# Patient Record
Sex: MALE | Race: ASIAN
[De-identification: names, ages, dates, MRNs, and addresses within clinical notes are randomized per-mention and may not be internally consistent; named-entity substitution may affect disease eponyms.]

---

## 2019-09-23 NOTE — OP
DATE OF PROCEDURE:  09/23/2019



PREOPERATIVE DIAGNOSES:  Gross hematuria and bladder lesion.



POSTOPERATIVE DIAGNOSES:  Gross hematuria and bladder lesion.



PROCEDURES PERFORMED:  Cysto, bladder biopsy, and fulguration with destruction of a

1 x 1.5 cm area of flat abnormal mucosa on the left floor/posterior wall region. 



SPECIMEN REMOVED:  Bladder biopsies x3.



ESTIMATED BLOOD LOSS:  Less than 25 mL.



DRAINS:  None.



FINDINGS:  There is no stricture disease.  There was mild-to-moderate BPH.  There

was a lesion in the bladder, had no blood on it.  At this time, it was not as easy

to see and identify as it had been in the office.  It was biopsied 3 times and then

a Bugbee was used to cauterize the mucosa that was not completely removed with a

biopsy and to obtain hemostasis. 



DESCRIPTION OF PROCEDURE:  After obtained written and verbal consent from the

patient, after receiving IV antibiotics, he was taken to the operating suite.  He

was placed in the supine position on the treatment table.  PlexiPulses were placed

on his lower extremities and turned on.  He was given a general anesthetic and oral

obturator intubation.  He was placed in the dorsal lithotomy position, sterilely

prepped and draped.  Cystoscopy was performed with a 22-Cambodian sheath.  This was

well lubricated and passed under direct vision through the male urethra and into the

urinary bladder with aid of a 30-degree lens and a video camera and monitor.  The

bladder was filled and emptied number of times as this was examined both the

30-degree lens and the 70-degree lens.  I then used a cold cup biopsy forceps to

biopsy 3 times this area and then 

used a Bugbee electrode to cauterize the biopsy sites as well as destroy the mucosa

that still existed in this region.  There was no significant bleeding.  The bladder

was 

drained.  The instruments were removed.  A Nevarez catheter was not left indwelling.

[facility] was awakened, extubated, [facility]. 







Job ID:  [id number]

## 2022-05-24 ENCOUNTER — HOSPITAL ENCOUNTER (OUTPATIENT)
Dept: HOSPITAL 92 - SLEEPLAB | Age: 67
Discharge: HOME | End: 2022-05-24
Attending: INTERNAL MEDICINE
Payer: COMMERCIAL

## 2022-05-24 DIAGNOSIS — G47.00: ICD-10-CM

## 2022-05-24 DIAGNOSIS — G47.10: ICD-10-CM

## 2022-05-24 DIAGNOSIS — G47.33: Primary | ICD-10-CM

## 2022-05-24 PROCEDURE — 95810 POLYSOM 6/> YRS 4/> PARAM: CPT

## 2022-07-10 ENCOUNTER — HOSPITAL ENCOUNTER (OUTPATIENT)
Dept: HOSPITAL 92 - SLEEPLAB | Age: 67
Discharge: HOME | End: 2022-07-10
Attending: INTERNAL MEDICINE
Payer: COMMERCIAL

## 2022-07-10 DIAGNOSIS — G47.00: ICD-10-CM

## 2022-07-10 DIAGNOSIS — R06.83: ICD-10-CM

## 2022-07-10 DIAGNOSIS — G47.33: Primary | ICD-10-CM

## 2022-07-10 DIAGNOSIS — E66.9: ICD-10-CM

## 2022-07-10 DIAGNOSIS — G47.10: ICD-10-CM

## 2022-07-10 PROCEDURE — 95811 POLYSOM 6/>YRS CPAP 4/> PARM: CPT
